# Patient Record
Sex: FEMALE | Race: WHITE | NOT HISPANIC OR LATINO | URBAN - METROPOLITAN AREA
[De-identification: names, ages, dates, MRNs, and addresses within clinical notes are randomized per-mention and may not be internally consistent; named-entity substitution may affect disease eponyms.]

---

## 2020-07-25 ENCOUNTER — TELEPHONE ENCOUNTER (OUTPATIENT)
Dept: URBAN - METROPOLITAN AREA CLINIC 13 | Facility: CLINIC | Age: 75
End: 2020-07-25

## 2020-07-25 RX ORDER — GLUCOSA SU 2KCL/CHONDROITIN SU 500-400 MG
TAKE 1 TABLET DAILY CAPSULE ORAL
Refills: 0 | OUTPATIENT
Start: 2006-03-15 | End: 2016-07-13

## 2020-07-26 ENCOUNTER — TELEPHONE ENCOUNTER (OUTPATIENT)
Dept: URBAN - METROPOLITAN AREA CLINIC 13 | Facility: CLINIC | Age: 75
End: 2020-07-26

## 2020-07-26 RX ORDER — IBUPROFEN 200 MG
TAKE 2 TABLET DAILY PRN TABLET ORAL
Refills: 0 | Status: ACTIVE | COMMUNITY

## 2020-07-26 RX ORDER — ESTRADIOL 10 UG/1
INSERT 1 TABLET OTHER TWICE A WEEK INSERT VAGINAL
Refills: 0 | Status: ACTIVE | COMMUNITY

## 2020-07-26 RX ORDER — GLUCOSAM/MSM/CHOND/HRB149/HYAL 500-500 MG
TAKE 2 TABLET DAILY. PATIENT STATES UNKNOWN DOSAGE TABLET ORAL
Refills: 0 | Status: ACTIVE | COMMUNITY

## 2020-07-26 RX ORDER — LANSOPRAZOLE 30 MG/1
TAKE 1 CAPSULE DAILY CAPSULE, DELAYED RELEASE ORAL
Qty: 90 | Refills: 3 | Status: ACTIVE | COMMUNITY

## 2020-07-26 RX ORDER — VALACYCLOVIR HCL 500 MG
TAKE 1 TABLET DAILY TABLET ORAL
Refills: 0 | Status: ACTIVE | COMMUNITY

## 2024-01-05 ENCOUNTER — OFFICE VISIT (OUTPATIENT)
Dept: URBAN - METROPOLITAN AREA CLINIC 113 | Facility: CLINIC | Age: 79
End: 2024-01-05
Payer: MEDICARE

## 2024-01-05 VITALS
DIASTOLIC BLOOD PRESSURE: 70 MMHG | TEMPERATURE: 97.5 F | WEIGHT: 151 LBS | BODY MASS INDEX: 25.16 KG/M2 | SYSTOLIC BLOOD PRESSURE: 123 MMHG | HEART RATE: 78 BPM | HEIGHT: 65 IN | RESPIRATION RATE: 18 BRPM

## 2024-01-05 DIAGNOSIS — K21.9 GERD: ICD-10-CM

## 2024-01-05 DIAGNOSIS — R13.10 DYSPHAGIA: ICD-10-CM

## 2024-01-05 PROCEDURE — 99204 OFFICE O/P NEW MOD 45 MIN: CPT | Performed by: NURSE PRACTITIONER

## 2024-01-05 RX ORDER — OMEPRAZOLE 40 MG/1
1 CAPSULE 30 MINUTES BEFORE MORNING MEAL CAPSULE, DELAYED RELEASE ORAL ONCE A DAY
Status: ACTIVE | COMMUNITY
Start: 2024-01-05

## 2024-01-05 RX ORDER — GLUCOSAM/MSM/CHOND/HRB149/HYAL 500-500 MG
TAKE 2 TABLET DAILY. PATIENT STATES UNKNOWN DOSAGE TABLET ORAL
Refills: 0 | Status: ACTIVE | COMMUNITY

## 2024-01-05 RX ORDER — IBUPROFEN 200 MG
TAKE 2 TABLET DAILY PRN TABLET ORAL
Refills: 0 | Status: ACTIVE | COMMUNITY

## 2024-01-05 RX ORDER — DEXTROMETHORPHAN POLISTIREX 30 MG/5 ML
AS DIRECTED SUSPENSION, EXTENDED RELEASE 12 HR ORAL
Status: ACTIVE | COMMUNITY

## 2024-01-05 RX ORDER — LANSOPRAZOLE 30 MG/1
TAKE 1 CAPSULE DAILY CAPSULE, DELAYED RELEASE ORAL
Qty: 90 | Refills: 3 | Status: DISCONTINUED | COMMUNITY

## 2024-01-05 RX ORDER — MAGNESIUM 30 MG
1 TABLET WITH A MEAL TABLET ORAL ONCE A DAY
Status: ACTIVE | COMMUNITY
Start: 2024-01-05

## 2024-01-05 RX ORDER — OMEPRAZOLE 40 MG/1
1 CAPSULE 30 MINUTES BEFORE MORNING MEAL CAPSULE, DELAYED RELEASE ORAL ONCE A DAY
OUTPATIENT
Start: 2024-01-05

## 2024-01-05 RX ORDER — OLMESARTAN MEDOXOMIL AND HYDROCHLOROTHIAZIDE 40/12.5 40; 12.5 MG/1; MG/1
1 TABLET TABLET ORAL ONCE A DAY
Status: ACTIVE | COMMUNITY
Start: 2024-01-05

## 2024-01-05 RX ORDER — VALACYCLOVIR HCL 500 MG
TAKE 1 TABLET DAILY TABLET ORAL
Refills: 0 | Status: ACTIVE | COMMUNITY

## 2024-01-05 RX ORDER — MELOXICAM 7.5 MG
1 TABLET AS NEEDED TABLET ORAL ONCE A DAY
Status: ACTIVE | COMMUNITY
Start: 2024-01-05

## 2024-01-05 RX ORDER — ASCORBATE CALCIUM 500 MG
1 TABLET TABLET ORAL ONCE A DAY
Status: ACTIVE | COMMUNITY
Start: 2024-01-05

## 2024-01-05 RX ORDER — ESTRADIOL 10 UG/1
INSERT 1 TABLET OTHER TWICE A WEEK INSERT VAGINAL
Refills: 0 | Status: DISCONTINUED | COMMUNITY

## 2024-01-05 RX ORDER — ALENDRONATE SODIUM 70 MG/1
1 TABLET TABLET ORAL ONCE A DAY
Status: ACTIVE | COMMUNITY
Start: 2024-01-05

## 2024-01-05 RX ORDER — MECOBALAMIN 5000 MCG
AS DIRECTED LOZENGE ORAL ONCE A DAY
Status: ACTIVE | COMMUNITY
Start: 2024-01-05

## 2024-01-05 NOTE — HPI-TODAY'S VISIT:
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  She was last seen in the office in July 2016 for screening colonoscopy notable for mild internal hemorrhoids, otherwise normal.  She has chronic GERD, requiring some type of acid suppressive therapy for the last 15 years.  Her symptoms are overall managed with omeprazole 40 mg daily with infrequent breakthrough nighttime reflux which she attributes to diet or eating too close to bedtime.  She had a positive EsoGuard in October 2023 with her PCP.  She has never had an EGD.  She takes meloxicam a few times per month for shoulder and hip pain.  She complains of difficulty swallowing, indicating food will become lodged mid chest.  She can typically facilitate passage with a sip of water, but this will be painful as it goes down.

## 2024-01-06 ENCOUNTER — LAB OUTSIDE AN ENCOUNTER (OUTPATIENT)
Dept: URBAN - METROPOLITAN AREA CLINIC 113 | Facility: CLINIC | Age: 79
End: 2024-01-06

## 2024-01-19 ENCOUNTER — OUT OF OFFICE VISIT (OUTPATIENT)
Dept: URBAN - METROPOLITAN AREA SURGERY CENTER 25 | Facility: SURGERY CENTER | Age: 79
End: 2024-01-19
Payer: MEDICARE

## 2024-01-19 ENCOUNTER — CLAIMS CREATED FROM THE CLAIM WINDOW (OUTPATIENT)
Dept: URBAN - METROPOLITAN AREA CLINIC 4 | Facility: CLINIC | Age: 79
End: 2024-01-19
Payer: MEDICARE

## 2024-01-19 ENCOUNTER — TELEPHONE ENCOUNTER (OUTPATIENT)
Dept: URBAN - METROPOLITAN AREA CLINIC 113 | Facility: CLINIC | Age: 79
End: 2024-01-19

## 2024-01-19 ENCOUNTER — LAB OUTSIDE AN ENCOUNTER (OUTPATIENT)
Dept: URBAN - METROPOLITAN AREA CLINIC 113 | Facility: CLINIC | Age: 79
End: 2024-01-19

## 2024-01-19 DIAGNOSIS — K31.89 OTHER DISEASES OF STOMACH AND DUODENUM: ICD-10-CM

## 2024-01-19 DIAGNOSIS — R13.10 DYSPHAGIA, UNSPECIFIED: ICD-10-CM

## 2024-01-19 DIAGNOSIS — K21.9 GASTRO-ESOPHAGEAL REFLUX DISEASE WITHOUT ESOPHAGITIS: ICD-10-CM

## 2024-01-19 DIAGNOSIS — K29.40 CHRONIC ATROPHIC GASTRITIS WITHOUT BLEEDING: ICD-10-CM

## 2024-01-19 DIAGNOSIS — K29.40 ATROPHIC GASTRITIS: ICD-10-CM

## 2024-01-19 DIAGNOSIS — K44.9 HIATAL HERNIA: ICD-10-CM

## 2024-01-19 DIAGNOSIS — R13.19 CERVICAL DYSPHAGIA: ICD-10-CM

## 2024-01-19 DIAGNOSIS — K21.9 ACID REFLUX: ICD-10-CM

## 2024-01-19 PROCEDURE — 43248 EGD GUIDE WIRE INSERTION: CPT | Performed by: INTERNAL MEDICINE

## 2024-01-19 PROCEDURE — 43239 EGD BIOPSY SINGLE/MULTIPLE: CPT | Performed by: INTERNAL MEDICINE

## 2024-01-19 PROCEDURE — 88305 TISSUE EXAM BY PATHOLOGIST: CPT | Performed by: PATHOLOGY

## 2024-01-19 PROCEDURE — 88341 IMHCHEM/IMCYTCHM EA ADD ANTB: CPT | Performed by: PATHOLOGY

## 2024-01-19 PROCEDURE — 88312 SPECIAL STAINS GROUP 1: CPT | Performed by: PATHOLOGY

## 2024-01-19 PROCEDURE — 00731 ANES UPR GI NDSC PX NOS: CPT | Performed by: ANESTHESIOLOGY

## 2024-01-19 PROCEDURE — 00731 ANES UPR GI NDSC PX NOS: CPT | Performed by: ANESTHESIOLOGIST ASSISTANT

## 2024-01-19 PROCEDURE — G8907 PT DOC NO EVENTS ON DISCHARG: HCPCS | Performed by: INTERNAL MEDICINE

## 2024-01-19 PROCEDURE — 88342 IMHCHEM/IMCYTCHM 1ST ANTB: CPT | Performed by: PATHOLOGY

## 2024-01-19 RX ORDER — MELOXICAM 7.5 MG
1 TABLET AS NEEDED TABLET ORAL ONCE A DAY
Status: ACTIVE | COMMUNITY
Start: 2024-01-05

## 2024-01-19 RX ORDER — DEXTROMETHORPHAN POLISTIREX 30 MG/5 ML
AS DIRECTED SUSPENSION, EXTENDED RELEASE 12 HR ORAL
Status: ACTIVE | COMMUNITY

## 2024-01-19 RX ORDER — GLUCOSAM/MSM/CHOND/HRB149/HYAL 500-500 MG
TAKE 2 TABLET DAILY. PATIENT STATES UNKNOWN DOSAGE TABLET ORAL
Refills: 0 | Status: ACTIVE | COMMUNITY

## 2024-01-19 RX ORDER — OLMESARTAN MEDOXOMIL AND HYDROCHLOROTHIAZIDE 40/12.5 40; 12.5 MG/1; MG/1
1 TABLET TABLET ORAL ONCE A DAY
Status: ACTIVE | COMMUNITY
Start: 2024-01-05

## 2024-01-19 RX ORDER — ASCORBATE CALCIUM 500 MG
1 TABLET TABLET ORAL ONCE A DAY
Status: ACTIVE | COMMUNITY
Start: 2024-01-05

## 2024-01-19 RX ORDER — OMEPRAZOLE 40 MG/1
1 CAPSULE 30 MINUTES BEFORE MORNING MEAL CAPSULE, DELAYED RELEASE ORAL ONCE A DAY
Status: ACTIVE | COMMUNITY
Start: 2024-01-05

## 2024-01-19 RX ORDER — ALENDRONATE SODIUM 70 MG/1
1 TABLET TABLET ORAL ONCE A DAY
Status: ACTIVE | COMMUNITY
Start: 2024-01-05

## 2024-01-19 RX ORDER — IBUPROFEN 200 MG
TAKE 2 TABLET DAILY PRN TABLET ORAL
Refills: 0 | Status: ACTIVE | COMMUNITY

## 2024-01-19 RX ORDER — VALACYCLOVIR HCL 500 MG
TAKE 1 TABLET DAILY TABLET ORAL
Refills: 0 | Status: ACTIVE | COMMUNITY

## 2024-01-19 RX ORDER — MECOBALAMIN 5000 MCG
AS DIRECTED LOZENGE ORAL ONCE A DAY
Status: ACTIVE | COMMUNITY
Start: 2024-01-05

## 2024-01-19 RX ORDER — MAGNESIUM 30 MG
1 TABLET WITH A MEAL TABLET ORAL ONCE A DAY
Status: ACTIVE | COMMUNITY
Start: 2024-01-05

## 2024-02-26 ENCOUNTER — OV EP (OUTPATIENT)
Dept: URBAN - METROPOLITAN AREA CLINIC 113 | Facility: CLINIC | Age: 79
End: 2024-02-26
Payer: MEDICARE

## 2024-02-26 VITALS
HEIGHT: 65 IN | SYSTOLIC BLOOD PRESSURE: 138 MMHG | TEMPERATURE: 97.8 F | DIASTOLIC BLOOD PRESSURE: 68 MMHG | WEIGHT: 141.8 LBS | RESPIRATION RATE: 20 BRPM | BODY MASS INDEX: 23.63 KG/M2 | HEART RATE: 75 BPM

## 2024-02-26 DIAGNOSIS — K22.4 ESOPHAGEAL DYSMOTILITY: ICD-10-CM

## 2024-02-26 DIAGNOSIS — R13.10 DYSPHAGIA: ICD-10-CM

## 2024-02-26 DIAGNOSIS — R13.14 CRICOPHARYNGEAL DISORDER: ICD-10-CM

## 2024-02-26 DIAGNOSIS — K21.9 GERD: ICD-10-CM

## 2024-02-26 PROCEDURE — 99214 OFFICE O/P EST MOD 30 MIN: CPT | Performed by: INTERNAL MEDICINE

## 2024-02-26 RX ORDER — MELOXICAM 7.5 MG
1 TABLET AS NEEDED TABLET ORAL ONCE A DAY
Status: ACTIVE | COMMUNITY
Start: 2024-01-05

## 2024-02-26 RX ORDER — VALACYCLOVIR HCL 500 MG
TAKE 1 TABLET DAILY TABLET ORAL
Refills: 0 | Status: ACTIVE | COMMUNITY

## 2024-02-26 RX ORDER — OMEPRAZOLE 40 MG/1
1 CAPSULE 30 MINUTES BEFORE MORNING MEAL CAPSULE, DELAYED RELEASE ORAL ONCE A DAY
Status: ACTIVE | COMMUNITY
Start: 2024-01-05

## 2024-02-26 RX ORDER — ASCORBATE CALCIUM 500 MG
1 TABLET TABLET ORAL ONCE A DAY
Status: ACTIVE | COMMUNITY
Start: 2024-01-05

## 2024-02-26 RX ORDER — MAGNESIUM 30 MG
1 TABLET WITH A MEAL TABLET ORAL ONCE A DAY
Status: ACTIVE | COMMUNITY
Start: 2024-01-05

## 2024-02-26 RX ORDER — GLUCOSAM/MSM/CHOND/HRB149/HYAL 500-500 MG
TAKE 2 TABLET DAILY. PATIENT STATES UNKNOWN DOSAGE TABLET ORAL
Refills: 0 | Status: ACTIVE | COMMUNITY

## 2024-02-26 RX ORDER — LISINOPRIL AND HYDROCHLOROTHIAZIDE 10; 12.5 MG/1; MG/1
1 TABLET TABLET ORAL ONCE A DAY
Status: ACTIVE | COMMUNITY

## 2024-02-26 RX ORDER — DEXTROMETHORPHAN POLISTIREX 30 MG/5 ML
AS DIRECTED SUSPENSION, EXTENDED RELEASE 12 HR ORAL
Status: ACTIVE | COMMUNITY

## 2024-02-26 RX ORDER — MECOBALAMIN 5000 MCG
AS DIRECTED LOZENGE ORAL ONCE A DAY
Status: ACTIVE | COMMUNITY
Start: 2024-01-05

## 2024-02-26 RX ORDER — IBUPROFEN 200 MG
TAKE 2 TABLET DAILY PRN TABLET ORAL
Refills: 0 | Status: ACTIVE | COMMUNITY

## 2024-02-26 NOTE — HPI-TODAY'S VISIT:
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  She did have a moderate response to the recent esophageal dilation. We discussed the barium swallow in detail. She is cutting her food up and chewing more carefully. She eats more slowly. Dysphagia is basically controlled at this time. Reflux is controlled. She stopped olmesartan. Stool frequency is better. She stopped the alendronate. No rectal bleeding or melena. She believes that magnesium may be contributing to her stool frequency. But once again, the diarrhea is slowly improving off of the olmesartan.  She was seen in the office in July 2016 for screening colonoscopy notable for mild internal hemorrhoids, otherwise normal.  Her GERD symptoms are overall managed with omeprazole 40 mg daily with infrequent breakthrough nighttime reflux which she attributes to diet or eating too close to bedtime.    She had a positive EsoGuard in October 2023 with her PCP.   Barium swallow February 2024.  Mild tertiary contractions.  Large cricopharyngeal bar.  Delayed esophageal emptying and intraesophageal reflux noted.  Small hiatal hernia.  EGD January 2024.  Small hiatal hernia.  Patulous lower esophageal sphincter.  Mild nonerosive antral gastritis.  Normal distal esophagus.  Normal duodenum.  Empirically dilated to 45 [place]gas[place].  Biopsies were notable for normal duodenum. Gastric mucosa was notable for focal intestinal metaplasia.  Chronic inactive gastritis.  Gastric biopsies were notable for reflux type changes.  Esoguard. Positive.  October 2023  Colonoscopy July 2016.  Mild internal hemorrhoids.  Otherwise normal  Colonoscopy 2006.  Normal

## 2025-01-28 ENCOUNTER — DASHBOARD ENCOUNTERS (OUTPATIENT)
Age: 80
End: 2025-01-28

## 2025-01-28 ENCOUNTER — LAB OUTSIDE AN ENCOUNTER (OUTPATIENT)
Dept: URBAN - METROPOLITAN AREA CLINIC 113 | Facility: CLINIC | Age: 80
End: 2025-01-28

## 2025-01-28 ENCOUNTER — OFFICE VISIT (OUTPATIENT)
Dept: URBAN - METROPOLITAN AREA CLINIC 113 | Facility: CLINIC | Age: 80
End: 2025-01-28
Payer: MEDICARE

## 2025-01-28 VITALS
SYSTOLIC BLOOD PRESSURE: 134 MMHG | HEART RATE: 75 BPM | DIASTOLIC BLOOD PRESSURE: 62 MMHG | WEIGHT: 146 LBS | RESPIRATION RATE: 18 BRPM | HEIGHT: 65 IN | BODY MASS INDEX: 24.32 KG/M2 | TEMPERATURE: 97.8 F

## 2025-01-28 DIAGNOSIS — E61.1 IRON DEFICIENCY: ICD-10-CM

## 2025-01-28 DIAGNOSIS — R13.14 CRICOPHARYNGEAL DISORDER: ICD-10-CM

## 2025-01-28 DIAGNOSIS — K22.4 ESOPHAGEAL DYSMOTILITY: ICD-10-CM

## 2025-01-28 DIAGNOSIS — R13.10 DYSPHAGIA: ICD-10-CM

## 2025-01-28 DIAGNOSIS — Z12.11 COLON CANCER SCREENING: ICD-10-CM

## 2025-01-28 DIAGNOSIS — K31.89 GASTRIC INTESTINAL METAPLASIA: ICD-10-CM

## 2025-01-28 DIAGNOSIS — K21.9 GERD: ICD-10-CM

## 2025-01-28 PROCEDURE — 99214 OFFICE O/P EST MOD 30 MIN: CPT | Performed by: INTERNAL MEDICINE

## 2025-01-28 RX ORDER — MECOBALAMIN 5000 MCG
AS DIRECTED LOZENGE ORAL ONCE A DAY
Status: ACTIVE | COMMUNITY
Start: 2024-01-05

## 2025-01-28 RX ORDER — MELOXICAM 7.5 MG/1
TAKE 1 TABLET BY MOUTH EVERY DAY TABLET ORAL
Qty: 90 EACH | Refills: 0 | Status: ACTIVE | COMMUNITY

## 2025-01-28 RX ORDER — VALACYCLOVIR 500 MG/1
TAKE 1 TABLET BY MOUTH EVERY DAY TABLET, FILM COATED ORAL
Qty: 90 EACH | Refills: 0 | Status: ACTIVE | COMMUNITY

## 2025-01-28 RX ORDER — OMEPRAZOLE 40 MG/1
1 CAPSULE 30 MINUTES BEFORE MORNING MEAL CAPSULE, DELAYED RELEASE ORAL ONCE A DAY
Status: ACTIVE | COMMUNITY
Start: 2024-01-05

## 2025-01-28 RX ORDER — ASCORBATE CALCIUM 500 MG
1 TABLET TABLET ORAL ONCE A DAY
Status: ACTIVE | COMMUNITY
Start: 2024-01-05

## 2025-01-28 RX ORDER — DEXTROMETHORPHAN POLISTIREX 30 MG/5 ML
AS DIRECTED SUSPENSION, EXTENDED RELEASE 12 HR ORAL
Status: ACTIVE | COMMUNITY

## 2025-01-28 RX ORDER — OMEPRAZOLE 40 MG/1
1 CAPSULE CAPSULE, DELAYED RELEASE ORAL TWICE DAILY
Qty: 180 | Refills: 3 | OUTPATIENT
Start: 2025-01-28

## 2025-01-28 RX ORDER — VALACYCLOVIR HCL 500 MG
TAKE 1 TABLET DAILY TABLET ORAL
Refills: 0 | Status: ACTIVE | COMMUNITY

## 2025-01-28 RX ORDER — CHLORTHALIDONE 25 MG/1
TAKE 1 TABLET BY MOUTH EVERY DAY TABLET ORAL
Qty: 90 EACH | Refills: 0 | Status: ACTIVE | COMMUNITY

## 2025-01-28 RX ORDER — FAMOTIDINE 40 MG/1
1 TABLET AT BEDTIME TABLET, FILM COATED ORAL ONCE A DAY
Qty: 90 TABLET | Refills: 3 | OUTPATIENT
Start: 2025-01-28

## 2025-01-28 RX ORDER — AMLODIPINE BESYLATE 2.5 MG/1
1 TABLET TABLET ORAL ONCE A DAY
Status: ACTIVE | COMMUNITY

## 2025-01-28 NOTE — HPI-TODAY'S VISIT:
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  Since her last visit she has had some difficulties with some intermittent reflux. Particularly at night. Woke her up in December. Several times actually. Pain was moderately severe. They should increase the omeprazole dosage to twice daily. She was given a prescription for famotidine but she never took it. She has intermittent difficulty with swallowing but definitely better than previously noted. Prior to the dilation. Not ready for repeat dilation at this time. No significant regurgitation. Weight is stable and appetite is fine. No abdominal pain. No diarrhea or constipation. She was diagnosed with iron deficiency. Ferritin was 10 in July 2024. She was sent to hematology. She was started on oral iron. She is already on oral B12. Previous B12 level was normal. She did have some peripheral neuropathy in the past and this is why she started B12. She sees Dr. Bell and he recommended that she discontinue meloxicam. She has not taken the meloxicam about 4 times a month. She does not take other NSAIDs.  Osteopenia: Now on annual reclast  Labs. October 31, 2024. Hemoglobin 12.8, MCV 98, platelet count 382K, ferritin 17, iron saturation 38%  Labs. July 2024. Hemoglobin 12.1, MCV 98, ferritin was 10.  Labs. June 2024. Hemoglobin 12.8. CMP normal.  Barium swallow February 2024.  Mild tertiary contractions.  Large cricopharyngeal bar.  Delayed esophageal emptying and intraesophageal reflux noted.  Small hiatal hernia.  EGD January 2024.  Small hiatal hernia.  Patulous lower esophageal sphincter.  Mild nonerosive antral gastritis.  Normal distal esophagus.  Normal duodenum.  Empirically dilated to 45 Faroese.  Biopsies were notable for normal duodenum.  Gastric mucosa was notable for focal intestinal metaplasia.  Chronic inactive gastritis.  Gastric biopsies were notable for reflux type changes.  Labs. September 2023. B12 level normal at 585.  Esoguard. Positive.  October 2023  Colonoscopy July 2016.  Mild internal hemorrhoids.  Otherwise normal  Colonoscopy 2006.  Normal

## 2025-02-21 ENCOUNTER — CLAIMS CREATED FROM THE CLAIM WINDOW (OUTPATIENT)
Dept: URBAN - METROPOLITAN AREA SURGERY CENTER 25 | Facility: SURGERY CENTER | Age: 80
End: 2025-02-21
Payer: MEDICARE

## 2025-02-21 ENCOUNTER — TELEPHONE ENCOUNTER (OUTPATIENT)
Dept: URBAN - METROPOLITAN AREA CLINIC 113 | Facility: CLINIC | Age: 80
End: 2025-02-21

## 2025-02-21 DIAGNOSIS — Z12.11 COLON CANCER SCREENING: ICD-10-CM

## 2025-02-21 DIAGNOSIS — D50.9 ANEMIA, IRON DEFICIENCY: ICD-10-CM

## 2025-02-21 PROCEDURE — 00812 ANES LWR INTST SCR COLSC: CPT | Performed by: ANESTHESIOLOGY

## 2025-02-21 PROCEDURE — 00812 ANES LWR INTST SCR COLSC: CPT | Performed by: NURSE ANESTHETIST, CERTIFIED REGISTERED

## 2025-02-21 PROCEDURE — G0121 COLON CA SCRN NOT HI RSK IND: HCPCS | Performed by: INTERNAL MEDICINE

## 2025-02-21 PROCEDURE — 0528F RCMND FLW-UP 10 YRS DOCD: CPT | Performed by: INTERNAL MEDICINE

## 2025-02-21 RX ORDER — ASCORBATE CALCIUM 500 MG
1 TABLET TABLET ORAL ONCE A DAY
Status: ACTIVE | COMMUNITY
Start: 2024-01-05

## 2025-02-21 RX ORDER — VALACYCLOVIR 500 MG/1
TAKE 1 TABLET BY MOUTH EVERY DAY TABLET, FILM COATED ORAL
Qty: 90 EACH | Refills: 0 | Status: ACTIVE | COMMUNITY

## 2025-02-21 RX ORDER — OMEPRAZOLE 40 MG/1
1 CAPSULE 30 MINUTES BEFORE MORNING MEAL CAPSULE, DELAYED RELEASE ORAL ONCE A DAY
Status: ACTIVE | COMMUNITY
Start: 2024-01-05

## 2025-02-21 RX ORDER — FAMOTIDINE 40 MG/1
1 TABLET AT BEDTIME TABLET, FILM COATED ORAL ONCE A DAY
Qty: 90 TABLET | Refills: 3 | Status: ACTIVE | COMMUNITY
Start: 2025-01-28

## 2025-02-21 RX ORDER — CHLORTHALIDONE 25 MG/1
TAKE 1 TABLET BY MOUTH EVERY DAY TABLET ORAL
Qty: 90 EACH | Refills: 0 | Status: ACTIVE | COMMUNITY

## 2025-02-21 RX ORDER — OMEPRAZOLE 40 MG/1
1 CAPSULE CAPSULE, DELAYED RELEASE ORAL TWICE DAILY
Qty: 180 | Refills: 3 | Status: ACTIVE | COMMUNITY
Start: 2025-01-28

## 2025-02-21 RX ORDER — AMLODIPINE BESYLATE 2.5 MG/1
1 TABLET TABLET ORAL ONCE A DAY
Status: ACTIVE | COMMUNITY

## 2025-02-21 RX ORDER — MELOXICAM 7.5 MG/1
TAKE 1 TABLET BY MOUTH EVERY DAY TABLET ORAL
Qty: 90 EACH | Refills: 0 | Status: ACTIVE | COMMUNITY

## 2025-02-21 RX ORDER — VALACYCLOVIR HCL 500 MG
TAKE 1 TABLET DAILY TABLET ORAL
Refills: 0 | Status: ACTIVE | COMMUNITY

## 2025-02-21 RX ORDER — MECOBALAMIN 5000 MCG
AS DIRECTED LOZENGE ORAL ONCE A DAY
Status: ACTIVE | COMMUNITY
Start: 2024-01-05

## 2025-02-21 RX ORDER — DEXTROMETHORPHAN POLISTIREX 30 MG/5 ML
AS DIRECTED SUSPENSION, EXTENDED RELEASE 12 HR ORAL
Status: ACTIVE | COMMUNITY

## 2025-04-24 ENCOUNTER — OFFICE VISIT (OUTPATIENT)
Dept: URBAN - METROPOLITAN AREA CLINIC 113 | Facility: CLINIC | Age: 80
End: 2025-04-24

## 2025-04-24 RX ORDER — FAMOTIDINE 40 MG/1
1 TABLET AT BEDTIME TABLET, FILM COATED ORAL ONCE A DAY
Qty: 90 TABLET | Refills: 3
Start: 2025-01-28

## 2025-04-24 RX ORDER — FAMOTIDINE 40 MG/1
1 TABLET AT BEDTIME TABLET, FILM COATED ORAL ONCE A DAY
Qty: 90 TABLET | Refills: 3 | Status: ACTIVE | COMMUNITY
Start: 2025-01-28

## 2025-04-24 RX ORDER — MECOBALAMIN 5000 MCG
AS DIRECTED LOZENGE ORAL ONCE A DAY
Status: ACTIVE | COMMUNITY
Start: 2024-01-05

## 2025-04-24 RX ORDER — OMEPRAZOLE 40 MG/1
1 CAPSULE CAPSULE, DELAYED RELEASE ORAL TWICE DAILY
Qty: 180 | Refills: 3 | Status: ACTIVE | COMMUNITY
Start: 2025-01-28

## 2025-04-24 RX ORDER — MELOXICAM 7.5 MG/1
TAKE 1 TABLET BY MOUTH EVERY DAY TABLET ORAL
Qty: 90 EACH | Refills: 0 | Status: ACTIVE | COMMUNITY

## 2025-04-24 RX ORDER — VALACYCLOVIR 500 MG/1
TAKE 1 TABLET BY MOUTH EVERY DAY TABLET, FILM COATED ORAL
Qty: 90 EACH | Refills: 0 | Status: ACTIVE | COMMUNITY

## 2025-04-24 RX ORDER — OMEPRAZOLE 40 MG/1
1 CAPSULE 30 MINUTES BEFORE MORNING MEAL CAPSULE, DELAYED RELEASE ORAL ONCE A DAY
Status: ACTIVE | COMMUNITY
Start: 2024-01-05

## 2025-04-24 RX ORDER — VALACYCLOVIR HCL 500 MG
TAKE 1 TABLET DAILY TABLET ORAL
Refills: 0 | Status: ACTIVE | COMMUNITY

## 2025-04-24 RX ORDER — AMLODIPINE BESYLATE 2.5 MG/1
1 TABLET TABLET ORAL ONCE A DAY
Status: ACTIVE | COMMUNITY

## 2025-04-24 RX ORDER — ASCORBATE CALCIUM 500 MG
1 TABLET TABLET ORAL ONCE A DAY
Status: ACTIVE | COMMUNITY
Start: 2024-01-05

## 2025-04-24 RX ORDER — DEXTROMETHORPHAN POLISTIREX 30 MG/5 ML
AS DIRECTED SUSPENSION, EXTENDED RELEASE 12 HR ORAL
Status: ACTIVE | COMMUNITY

## 2025-04-24 NOTE — HPI-TODAY'S VISIT:
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  Today she is doing fairly well. She is going on a major hiking trip to [facility] this week. She will be gone for almost 2 months. She has intermittent dysphagia. She wants a repeat upper endoscopy with dilation of this in the future. But she will do this after her vacation is completed. She has been taking the omeprazole in the morning and famotidine at night. Reflux symptoms at night seem to be a lot better. She has occasional fecal seepage. Not serious. No rectal bleeding or melena. No significant constipation at this time. No abdominal pain. Weight is stable. Appetite is fine. No nausea or vomiting  She was diagnosed with iron deficiency. Ferritin was 10 in July 2024. She was sent to hematology. She was started on oral iron. She is already on oral B12. Previous B12 level was normal. She did have some peripheral neuropathy in the past and this is why she started B12.   Osteopenia: on annual reclast  Colonoscopy February 2025. 4 nonbleeding cecal AVMs without contact bleeding. Observed given no history of anemia. Mild descending colonic diverticulosis. Small external hemorrhoids.  Labs. October 31, 2024. Hemoglobin 12.8, MCV 98, platelet count 382K, ferritin 17, iron saturation 38%  Labs. July 2024. Hemoglobin 12.1, MCV 98, ferritin was 10.  Labs. June 2024. Hemoglobin 12.8. CMP normal.  Barium swallow February 2024.  Mild tertiary contractions.  Large cricopharyngeal bar.  Delayed esophageal emptying and intraesophageal reflux noted.  Small hiatal hernia.  EGD January 2024.  Small hiatal hernia.  Patulous lower esophageal sphincter.  Mild nonerosive antral gastritis.  Normal distal esophagus.  Normal duodenum.  Empirically dilated to 45 Italian.  Biopsies were notable for normal duodenum.  Gastric mucosa was notable for focal intestinal metaplasia.  Chronic inactive gastritis.  Gastric biopsies were notable for reflux type changes.  Labs. September 2023. B12 level normal at 585.  Esoguard. Positive.  October 2023  Colonoscopy July 2016.  Mild internal hemorrhoids.  Otherwise normal  Colonoscopy 2006.  Normal

## 2025-04-24 NOTE — PHYSICAL EXAM HENT:
Head,  normocephalic,  atraumatic,  Face,  Face within normal limits,  Ears,  External ears within normal limits,  Nose/Nasopharynx,  External nose  normal appearance,  nares patent,  no nasal discharge,  Mouth and Throat,  Oral cavity appearance normal, Lips,  Appearance normal detailed exam

## 2025-08-26 ENCOUNTER — OFFICE VISIT (OUTPATIENT)
Dept: URBAN - METROPOLITAN AREA CLINIC 113 | Facility: CLINIC | Age: 80
End: 2025-08-26
Payer: MEDICARE

## 2025-08-26 DIAGNOSIS — R13.10 DYSPHAGIA: ICD-10-CM

## 2025-08-26 DIAGNOSIS — R13.14 CRICOPHARYNGEAL DISORDER: ICD-10-CM

## 2025-08-26 DIAGNOSIS — K21.9 GERD: ICD-10-CM

## 2025-08-26 DIAGNOSIS — K31.89 GASTRIC INTESTINAL METAPLASIA: ICD-10-CM

## 2025-08-26 DIAGNOSIS — E61.1 IRON DEFICIENCY: ICD-10-CM

## 2025-08-26 DIAGNOSIS — K22.4 ESOPHAGEAL DYSMOTILITY: ICD-10-CM

## 2025-08-26 PROCEDURE — 99213 OFFICE O/P EST LOW 20 MIN: CPT | Performed by: INTERNAL MEDICINE

## 2025-08-26 RX ORDER — FAMOTIDINE 40 MG/1
1 TABLET AT BEDTIME TABLET, FILM COATED ORAL ONCE A DAY
Qty: 90 TABLET | Refills: 3
Start: 2025-01-28

## 2025-08-26 RX ORDER — DEXTROMETHORPHAN POLISTIREX 30 MG/5 ML
AS DIRECTED SUSPENSION, EXTENDED RELEASE 12 HR ORAL
Status: ACTIVE | COMMUNITY

## 2025-08-26 RX ORDER — MECOBALAMIN 5000 MCG
AS DIRECTED LOZENGE ORAL ONCE A DAY
Status: ACTIVE | COMMUNITY
Start: 2024-01-05

## 2025-08-26 RX ORDER — MELOXICAM 7.5 MG/1
TAKE 1 TABLET BY MOUTH EVERY DAY TABLET ORAL
Qty: 90 EACH | Refills: 0 | Status: ACTIVE | COMMUNITY

## 2025-08-26 RX ORDER — AMLODIPINE BESYLATE 2.5 MG/1
1 TABLET TABLET ORAL ONCE A DAY
Status: ACTIVE | COMMUNITY

## 2025-08-26 RX ORDER — FAMOTIDINE 40 MG/1
1 TABLET AT BEDTIME TABLET, FILM COATED ORAL ONCE A DAY
Qty: 90 TABLET | Refills: 3 | Status: ACTIVE | COMMUNITY
Start: 2025-01-28

## 2025-08-26 RX ORDER — VALACYCLOVIR 500 MG/1
TAKE 1 TABLET BY MOUTH EVERY DAY TABLET, FILM COATED ORAL
Qty: 90 EACH | Refills: 0 | Status: ACTIVE | COMMUNITY

## 2025-08-26 RX ORDER — VALACYCLOVIR HCL 500 MG
TAKE 1 TABLET DAILY TABLET ORAL
Refills: 0 | Status: ACTIVE | COMMUNITY